# Patient Record
Sex: MALE | Race: WHITE
[De-identification: names, ages, dates, MRNs, and addresses within clinical notes are randomized per-mention and may not be internally consistent; named-entity substitution may affect disease eponyms.]

---

## 2020-07-02 ENCOUNTER — HOSPITAL ENCOUNTER (OUTPATIENT)
Dept: HOSPITAL 74 - JASU-SURG | Age: 45
Discharge: HOME | End: 2020-07-02
Attending: UROLOGY
Payer: COMMERCIAL

## 2020-07-02 VITALS — DIASTOLIC BLOOD PRESSURE: 70 MMHG | HEART RATE: 50 BPM | SYSTOLIC BLOOD PRESSURE: 110 MMHG

## 2020-07-02 VITALS — BODY MASS INDEX: 25.8 KG/M2

## 2020-07-02 VITALS — TEMPERATURE: 98.2 F

## 2020-07-02 DIAGNOSIS — N43.41: Primary | ICD-10-CM

## 2020-07-02 PROCEDURE — 0VBK0ZZ EXCISION OF LEFT EPIDIDYMIS, OPEN APPROACH: ICD-10-PCS | Performed by: UROLOGY

## 2020-07-02 NOTE — OP
DATE OF OPERATION:  07/02/2020

 

SURGEON:  Bravo Dominguez MD. 

 

PREOPERATIVE DIAGNOSIS:  Left spermatocele.  

 

POSTOPERATIVE DIAGNOSIS:  Left spermatocele.  

 

PROCEDURE:  Scrotal exploration, left spermatocelectomy.  

 

SPECIMEN:  Portion of spermatocele wall.

 

ESTIMATED BLOOD LOSS:  Minimal.  

 

DRAINS:  None.  

 

PREOPERATIVE INDICATION:  The patient is a 45-year-old male who in the past had

undergone a bilateral vasectomy.  Since then he has developed tense fluid and a tense

mass in his scrotum, which on ultrasound appeared to be cystic, consistent with a

spermatocele.  It is larger than his testicle and is creating a lot of discomfort. 

He comes to the OR today for excision of this spermatocele.  

 

DESCRIPTION OF PROCEDURE:  The patient was brought to the OR, placed on the table in

the supine position.  Given general anesthesia and IV antibiotics.  The groin was

prepped and draped sterilely. 

 

Timeout was performed.  

 

Injection of a combination of Marcaine and lidocaine were injected to the skin at the

left hemiscrotum.  A transverse incision was then made in the left hemiscrotum, and

the underlying tissues were dissected.  The tense fluid-filled structure consistent

with spermatocele was then identified.  This was opened, and a large amount of fluid

was suctioned out.  It was straw colored.  The testicle and the cyst wall were

delivered through incision.  The cyst wall was excised, and the remaining portions

were oversewn in an imbricated fashion, consistent with _____ technique.  Good

hemostasis was maintained throughout.  Testicle otherwise appeared to be normal. 

Testicle was reintroduced back into the scrotum.  The scrotum was closed in 2 layers

using 2-0 Vicryl suture and 3-0 chromic suture.  Wound was dressed.  Patient was

woken up.  

 

 

BRAVO DOMINGUEZ M.D.

 

[id number]

DD: 07/02/2020 14:46

DT: 07/02/2020 18:19

Job #:  [id number]

## 2020-07-08 NOTE — PATH
Surgical Pathology Report



Patient Name:  CRIS BORDEN

Accession #:  [id number]

[facility]. Rec. #:  [id number]                                                        

   /Age/Gender:  1975 (Age: 45) / M

Account:  [id number]                                                          

             Location: [facility] SURGICAL

Taken:  2020

Received:  2020

Reported:  2020

Physicians:  Bravo Dominguez M.D.

  



Specimen(s) Received

 LEFT SPERMATOCELE 





Clinical History

Left spermatocele







Final Diagnosis

LEFT SPERMATOCELE, EXCISION:

CYST WITH FIBROMUSCULAR WALL LINED BY BENIGN CUBOIDAL FOCALLY CILIATED

EPITHELIUM, CONSISTENT WITH SPERMATOCELE.







***Electronically Signed***

Jero Morris M.D.





Gross Description

Received in formalin labeled "left spermatocele," are 2 tan grey portion of

fibrous tissue measuring 3.8 x 1.8 x 0.2 cm and 4.5 x 1.5 x 0.1 cm, consistent

with portions of a spermatocele. Representative sections are submitted in one

cassette.

/2020



[facility]